# Patient Record
Sex: MALE | Race: BLACK OR AFRICAN AMERICAN
[De-identification: names, ages, dates, MRNs, and addresses within clinical notes are randomized per-mention and may not be internally consistent; named-entity substitution may affect disease eponyms.]

---

## 2019-09-12 ENCOUNTER — HOSPITAL ENCOUNTER (INPATIENT)
Dept: HOSPITAL 74 - FM/S | Age: 34
Discharge: HOME | DRG: 304 | End: 2019-09-12
Attending: ORTHOPAEDIC SURGERY | Admitting: ORTHOPAEDIC SURGERY
Payer: COMMERCIAL

## 2019-09-12 VITALS — SYSTOLIC BLOOD PRESSURE: 124 MMHG | TEMPERATURE: 98.3 F | DIASTOLIC BLOOD PRESSURE: 71 MMHG | HEART RATE: 66 BPM

## 2019-09-12 VITALS — BODY MASS INDEX: 30.1 KG/M2

## 2019-09-12 DIAGNOSIS — M48.061: ICD-10-CM

## 2019-09-12 DIAGNOSIS — M43.16: Primary | ICD-10-CM

## 2019-09-12 PROCEDURE — 0SB20ZZ EXCISION OF LUMBAR VERTEBRAL DISC, OPEN APPROACH: ICD-10-PCS | Performed by: ORTHOPAEDIC SURGERY

## 2019-09-12 PROCEDURE — 4A11X4G MONITORING OF PERIPHERAL NERVOUS ELECTRICAL ACTIVITY, INTRAOPERATIVE, EXTERNAL APPROACH: ICD-10-PCS | Performed by: ORTHOPAEDIC SURGERY

## 2019-09-12 PROCEDURE — 0SG00AJ FUSION OF LUMBAR VERTEBRAL JOINT WITH INTERBODY FUSION DEVICE, POSTERIOR APPROACH, ANTERIOR COLUMN, OPEN APPROACH: ICD-10-PCS | Performed by: ORTHOPAEDIC SURGERY

## 2019-09-12 NOTE — OP
Operative Note





- Note:


Operative Date: 09/12/19


Pre-Operative Diagnosis: lumbar L4-5 spondylolithesis


Operation: posterior lumbar decompression, instrumentation, fusion. 

Transforaminal interbody L4-5 fusion with allograt and neuromonitoring


Surgeon: Ar Harper


Assistant: Dolly Carbajal


Anesthesiologist/CRNA: Ariel Zhu


Anesthesia: Spinal


Estimated Blood Loss (mls): 30


Fluid Volume Replaced (mls): 800


Operative Report Dictated: Yes

## 2019-09-12 NOTE — SURG
Surgery First Assist Note


First Assist: Dolly Carbajal PA-C


Date of Service: 09/12/19


Diagnosis: 





: lumbar L4-5 spondylolithesis





Procedure: 








 posterior lumbar decompression, instrumentation, fusion. Transforaminal 

interbody L4-5 fusion with allograt and neuromonitoring


I was present for the entirety of the operative procedure. For further detail, 

please refer to operative report.








Visit type





- Case Type


Case Type: Scheduled





- Emergency


Emergency Visit: No





- New patient


This patient is new to me today: Yes


Date on this admission: 09/12/19

## 2019-09-12 NOTE — OP
DATE OF OPERATION:  09/12/2019

 

PREOPERATIVE DIAGNOSES:  

1.  Spinal stenosis, L4-5.

2.  Spondylolisthesis, L4-5.

 

POSTOPERATIVE DIAGNOSES

1.  Spinal stenosis, L4-5.

2.  Spondylolisthesis, L4-5.

 

PROCEDURE PERFORMED:  

1.  Transforaminal lumbar interbody fusion, L4-5.

2.  Placement of instrumentation at L4-5.

3.  Placement of prosthetic cage.

 

SURGEON:  Ar Harper MD

 

ASSISTANT:  DOMO Benitez

 

ESTIMATED BLOOD LOSS:  100 mL.

 

IV FLUIDS:  Per Anesthesia.

 

ANESTHESIA:  Spinal/TLIP.

 

COMPLICATIONS:  There were none.

 

DISPOSITION:  Patient brought to the PACU in stable condition.

 

INDICATIONS FOR SURGERY:  The patient is a 34-year-old gentleman who has been

suffering from pain from his back down his legs.  X-rays and MRI were completed,

which noted that he had spondylolisthesis at L4-5 secondary to a spondylolisthesis. 

He had gone through an exhaustive course of treatment for this, which included

medications, physical therapy, and injections.  Unfortunately, his pain continued to

persist despite all this.  At this point, risks, benefits, and alternatives were

discussed and the patient consented to surgery.

 

OPERATIVE NOTE:  The patient was brought to the operating room by anesthesia staff. 

After appropriate patient identification was performed, spinal anesthesia was given,

a TLIP block was also given.  Patient was able to position himself prone on the OR

table, with all areas of bony prominences well padded at this time.  Two needles were

placed in his back to armani off the L4-5 level.  An x-ray was taken.  The pedicles at

L4-5 were marked off.  The back was prepped and draped in a sterile manner.  

  

At this point timeout was completed.  An incision was made bilaterally over the L4

and L5 pedicles.  Dissection was carried down to fascia.  The fascia was split open

at this time.  Under C-arm guidance, trocars were advanced into both the L4 and L5

pedicles.  Through the trocars, a wire was inserted, and tap was performed and screws

inserted.  On the left-hand side, retractor blades were set up to expose the L4-5

facet joint.  The facet joint was removed.  The disk was entered using a series of

pituitaries, Kerrisons and curettes.  A diskectomy was completed.  The endplates were

decorticated at this time.  Bone graft was laid down.  A cage filled with bone graft

was placed in.  Tulip heads were placed over the screws.  A koko was measured and

placed and caps and compression was applied.  On right-hand side, a koko was measured

and placed in.  Caps and compression were applied.  All extra instrumentation was

removed at this time.  The fascia was closed with a number 1 Vicryl suture.  The

subcutaneous tissue was closed with 2-0 Vicryl suture.  Skin was closed with 3-0

Monocryl suture.  Dermabond was applied, Steri-Strips were applied, a sterile

dressing applied.  Patient was placed supine on the OR bed, brought to the PACU in

stable condition. 

 

 

 

DOMO BURNETT/[id number]

DD: 09/12/2019 11:50

DT: 09/12/2019 12:42

Job #:  [id number]